# Patient Record
Sex: MALE
[De-identification: names, ages, dates, MRNs, and addresses within clinical notes are randomized per-mention and may not be internally consistent; named-entity substitution may affect disease eponyms.]

---

## 2020-03-30 ENCOUNTER — NURSE TRIAGE (OUTPATIENT)
Dept: OTHER | Facility: CLINIC | Age: 30
End: 2020-03-30

## 2020-03-30 NOTE — TELEPHONE ENCOUNTER
Reason for Disposition   [1] Localized rash is very painful AND [2] no fever    Protocols used: RASH OR REDNESS - LOCALIZED-ADULT-    Pt is calling with c/o a rash that developed yesterday. Yesterday, rash was only on his left underarm. Today, rash has spread to his upper chest and back. Pt denies any itching, however pt does report pain. Pt is rating pain an 8 out of 10. Pt does not know the cause of this rash. Recommended that pt see a provider within 24 hours. Provided pt with care advice and information on 24/7 E-visit.